# Patient Record
Sex: MALE | Race: WHITE
[De-identification: names, ages, dates, MRNs, and addresses within clinical notes are randomized per-mention and may not be internally consistent; named-entity substitution may affect disease eponyms.]

---

## 2018-07-19 ENCOUNTER — HOSPITAL ENCOUNTER (OUTPATIENT)
Dept: HOSPITAL 47 - LABPAT | Age: 78
Discharge: HOME | End: 2018-07-19
Attending: UROLOGY
Payer: MEDICARE

## 2018-07-19 DIAGNOSIS — Z79.899: ICD-10-CM

## 2018-07-19 DIAGNOSIS — R35.0: ICD-10-CM

## 2018-07-19 DIAGNOSIS — R97.20: ICD-10-CM

## 2018-07-19 DIAGNOSIS — Z01.812: ICD-10-CM

## 2018-07-19 DIAGNOSIS — R53.83: ICD-10-CM

## 2018-07-19 DIAGNOSIS — R31.29: ICD-10-CM

## 2018-07-19 DIAGNOSIS — N40.1: ICD-10-CM

## 2018-07-19 DIAGNOSIS — Z01.818: Primary | ICD-10-CM

## 2018-07-19 LAB
ANION GAP SERPL CALC-SCNC: 5 MMOL/L
BASOPHILS # BLD AUTO: 0 K/UL (ref 0–0.2)
BASOPHILS NFR BLD AUTO: 1 %
BUN SERPL-SCNC: 13 MG/DL (ref 9–20)
CALCIUM SPEC-MCNC: 8.9 MG/DL (ref 8.4–10.2)
CHLORIDE SERPL-SCNC: 101 MMOL/L (ref 98–107)
CO2 SERPL-SCNC: 30 MMOL/L (ref 22–30)
EOSINOPHIL # BLD AUTO: 0.1 K/UL (ref 0–0.7)
EOSINOPHIL NFR BLD AUTO: 2 %
ERYTHROCYTE [DISTWIDTH] IN BLOOD BY AUTOMATED COUNT: 4.58 M/UL (ref 4.3–5.9)
ERYTHROCYTE [DISTWIDTH] IN BLOOD: 13.8 % (ref 11.5–15.5)
GLUCOSE SERPL-MCNC: 102 MG/DL (ref 74–99)
HCT VFR BLD AUTO: 41.1 % (ref 39–53)
HGB BLD-MCNC: 13.6 GM/DL (ref 13–17.5)
LYMPHOCYTES # SPEC AUTO: 1.2 K/UL (ref 1–4.8)
LYMPHOCYTES NFR SPEC AUTO: 29 %
MCH RBC QN AUTO: 29.6 PG (ref 25–35)
MCHC RBC AUTO-ENTMCNC: 33 G/DL (ref 31–37)
MCV RBC AUTO: 89.6 FL (ref 80–100)
MONOCYTES # BLD AUTO: 0.4 K/UL (ref 0–1)
MONOCYTES NFR BLD AUTO: 9 %
NEUTROPHILS # BLD AUTO: 2.4 K/UL (ref 1.3–7.7)
NEUTROPHILS NFR BLD AUTO: 57 %
PLATELET # BLD AUTO: 142 K/UL (ref 150–450)
POTASSIUM SERPL-SCNC: 4.6 MMOL/L (ref 3.5–5.1)
PSA SERPL-MCNC: 5.67 NG/ML (ref 0–4)
SODIUM SERPL-SCNC: 136 MMOL/L (ref 137–145)
WBC # BLD AUTO: 4.2 K/UL (ref 3.8–10.6)

## 2018-07-19 PROCEDURE — 93005 ELECTROCARDIOGRAM TRACING: CPT

## 2018-07-19 PROCEDURE — 87086 URINE CULTURE/COLONY COUNT: CPT

## 2018-07-19 PROCEDURE — 80048 BASIC METABOLIC PNL TOTAL CA: CPT

## 2018-07-19 PROCEDURE — 36415 COLL VENOUS BLD VENIPUNCTURE: CPT

## 2018-07-19 PROCEDURE — 85025 COMPLETE CBC W/AUTO DIFF WBC: CPT

## 2018-07-19 PROCEDURE — 84153 ASSAY OF PSA TOTAL: CPT

## 2018-07-30 NOTE — P.GSHP
History of Present Illness


H&P Date: 07/30/18


Chief Complaint: BPH with obstruction


The patient is a 77-year-old white male wwith known BPH.  He has been treated 

with combination medical therapy (tamsulosin and finasteride), but continues to 

experience difficulty emptying his bladder.  Cystoscopy reveals a proximal 

bulbous urethral stricture, as well as a completely occluding prostate.  He 

thus comes for cystoscopy, internal ureterotomy, and transurethral resection of 

prostate (TURP).





The patient's PSA level has been persistently elevated.  Prostate ultrasound in 

2012 revealed a prostate volume of 72 cc.  Biopsies were negative.





- Gastrointestinal


Gastrointestinal: Reports abdominal pain





- Integumentary


Integumentary: Reports rash





Past Medical History


Past Medical History: Hyperlipidemia, Prostate Disorder


Additional Past Medical History / Comment(s): BPH


History of Any Multi-Drug Resistant Organisms: None Reported


Past Surgical History: Hernia Repair


Additional Past Surgical History / Comment(s): Nasal surgery, Colonoscopy


Past Anesthesia/Blood Transfusion Reactions: No Reported Reaction


Smoking Status: Never smoker





- Past Family History


  ** Sister(s)


Family Medical History: Cancer





Medications and Allergies


 Home Medications











 Medication  Instructions  Recorded  Confirmed  Type


 


Tamsulosin HCl [Flomax] 0.4 mg PO DAILY 04/18/16 07/24/18 History


 


Aspirin [Adult Low Dose Aspirin EC] 81 mg PO DAILY 12/04/17 07/24/18 History











 Allergies











Allergy/AdvReac Type Severity Reaction Status Date / Time


 


No Known Allergies Allergy   Verified 07/24/18 12:32














Surgical - Exam





- General


well developed, well nourished, no distress





- Respiratory


normal respiratory effort, clear to auscultation





- Cardiovascular


Rhythm: regular


Abnormal Heart Sounds: no systolic murmur, no diastolic murmur, no rub, no S3 

Gallop, no S4 Gallop, no click, no other





- Abdomen


Abdomen: soft, non tender, no guarding, no rigid, no rebound





- Genitourinary


normal penis with no external lesions, testicles non-tender





Assessment and Plan


(1) Enlarged prostate with lower urinary tract symptoms (LUTS)


Status: Acute   Code(s): N40.1 - BENIGN PROSTATIC HYPERPLASIA WITH LOWER 

URINARY TRACT SYMP   SNOMED Code(s): 015826804


   





(2) Urethral stricture


Status: Acute   Code(s): N35.9 - URETHRAL STRICTURE, UNSPECIFIED   SNOMED Code(s

): 40750588


   


Plan: 


Cystoscopy, internal ureterotomy, bipolar transurethral resection of prostate (

TURP).  The procedure has been reviewed in detail with the patient.  Potential 

risks include anesthesia, bleeding,  infection, urinary incontinence, 

persistent voiding symptoms, bladder neck contracture, recurrent stricture, 

erectile dysfunction, and retrograde ejaculation.  He also understands the 

possibility that occult prostate cancer will be identified.

## 2018-08-02 ENCOUNTER — HOSPITAL ENCOUNTER (OUTPATIENT)
Dept: HOSPITAL 47 - OR | Age: 78
LOS: 2 days | Discharge: HOME | End: 2018-08-04
Attending: UROLOGY
Payer: MEDICARE

## 2018-08-02 VITALS — BODY MASS INDEX: 22.4 KG/M2

## 2018-08-02 DIAGNOSIS — N40.1: Primary | ICD-10-CM

## 2018-08-02 DIAGNOSIS — Q62.10: ICD-10-CM

## 2018-08-02 DIAGNOSIS — R39.14: ICD-10-CM

## 2018-08-02 PROCEDURE — 52601 PROSTATECTOMY (TURP): CPT

## 2018-08-02 PROCEDURE — 88305 TISSUE EXAM BY PATHOLOGIST: CPT

## 2018-08-02 RX ADMIN — CEFAZOLIN SCH: 330 INJECTION, POWDER, FOR SOLUTION INTRAMUSCULAR; INTRAVENOUS at 14:58

## 2018-08-02 RX ADMIN — POTASSIUM CHLORIDE SCH: 14.9 INJECTION, SOLUTION INTRAVENOUS at 13:43

## 2018-08-02 RX ADMIN — POTASSIUM CHLORIDE SCH MLS/HR: 14.9 INJECTION, SOLUTION INTRAVENOUS at 14:04

## 2018-08-02 RX ADMIN — POTASSIUM CHLORIDE SCH MLS: 14.9 INJECTION, SOLUTION INTRAVENOUS at 08:59

## 2018-08-02 RX ADMIN — DOCUSATE SODIUM SCH MG: 100 CAPSULE, LIQUID FILLED ORAL at 20:41

## 2018-08-03 VITALS — RESPIRATION RATE: 16 BRPM

## 2018-08-03 RX ADMIN — DOCUSATE SODIUM SCH MG: 100 CAPSULE, LIQUID FILLED ORAL at 08:03

## 2018-08-03 RX ADMIN — CEFAZOLIN SCH: 330 INJECTION, POWDER, FOR SOLUTION INTRAMUSCULAR; INTRAVENOUS at 16:41

## 2018-08-03 RX ADMIN — POTASSIUM CHLORIDE SCH MLS/HR: 14.9 INJECTION, SOLUTION INTRAVENOUS at 04:11

## 2018-08-03 RX ADMIN — POTASSIUM CHLORIDE SCH: 14.9 INJECTION, SOLUTION INTRAVENOUS at 17:27

## 2018-08-03 RX ADMIN — POTASSIUM CHLORIDE SCH: 14.9 INJECTION, SOLUTION INTRAVENOUS at 07:31

## 2018-08-03 RX ADMIN — DOCUSATE SODIUM SCH MG: 100 CAPSULE, LIQUID FILLED ORAL at 20:29

## 2018-08-03 NOTE — P.PN
Progress Note - Text


Progress Note Date: 08/03/18


The patient was seen earlier this morning.  At that time, the urine was 

essentially clear with continuous bladder irrigation running.  He was 

comfortable.  He was afebrile with stable vital signs.  Continuous bladder 

irrigation was held, and throughout the day hematuria was noted without clots.  

The decision was made for him to remain hospitalized an additional day.

## 2018-08-04 VITALS — DIASTOLIC BLOOD PRESSURE: 67 MMHG | HEART RATE: 85 BPM | TEMPERATURE: 97.6 F | SYSTOLIC BLOOD PRESSURE: 141 MMHG

## 2018-08-04 RX ADMIN — CEFAZOLIN SCH: 330 INJECTION, POWDER, FOR SOLUTION INTRAMUSCULAR; INTRAVENOUS at 17:24

## 2018-08-04 RX ADMIN — DOCUSATE SODIUM SCH MG: 100 CAPSULE, LIQUID FILLED ORAL at 11:08

## 2018-08-04 NOTE — P.DS
Providers


Expected date of discharge: 08/04/18


Attending physician: 


Mirza Reynaga





Primary care physician: 


Bud Mcclureer








- Discharge Diagnosis(es)


(1) Enlarged prostate with lower urinary tract symptoms (LUTS)


Current Visit: No   Status: Acute   





(2) Urethral stricture


Current Visit: No   Status: Acute   


Hospital Course: 





On the day of admission, the patient underwent a DVIU and bipolar TURP.  

Postoperatively, there was somewhat more bleeding than is typical.  He was 

otherwise afebrile with stable vital signs, and quite comfortable.  Continuous 

bladder irrigation was discontinued on the first postoperative day, but the 

urine was pinkred in color.  On the second postoperative day, the urine was 

clear yellow in color, and he was comfortable.  He will thus be discharged home 

with the Ashton catheter.


Procedures: 


Cystoscopy, DVIU, bipolar TURP on 08/02/2018.





Patient Condition at Discharge: Good





Plan - Discharge Summary


Discharge Rx Participant: Yes


New Discharge Prescriptions: 


No Action


   Tamsulosin HCl [Flomax] 0.4 mg PO DAILY


   Aspirin [Adult Low Dose Aspirin EC] 81 mg PO DAILY


Discharge Medication List





Tamsulosin HCl [Flomax] 0.4 mg PO DAILY 04/18/16 [History]


Aspirin [Adult Low Dose Aspirin EC] 81 mg PO DAILY 12/04/17 [History]








Follow up Appointment(s)/Referral(s): 


Mirza Reynaga MD [STAFF PHYSICIAN] - 10 Days


Activity/Diet/Wound Care/Special Instructions: 


Discharge home with Ashton catheter.  Plug irrigation port of the catheter.  No 

lifting or strenuous activity.  Drink plenty of fluids.  Patient has been 

instructed to remove Ashton catheter early in the morning on 08/07/2018.


Discharge Disposition: HOME SELF-CARE

## 2018-08-10 ENCOUNTER — HOSPITAL ENCOUNTER (OUTPATIENT)
Dept: HOSPITAL 47 - EC | Age: 78
Setting detail: OBSERVATION
LOS: 1 days | Discharge: HOME | End: 2018-08-11
Attending: UROLOGY | Admitting: UROLOGY
Payer: MEDICARE

## 2018-08-10 DIAGNOSIS — R31.0: Primary | ICD-10-CM

## 2018-08-10 DIAGNOSIS — Z79.82: ICD-10-CM

## 2018-08-10 DIAGNOSIS — N32.89: ICD-10-CM

## 2018-08-10 DIAGNOSIS — Z79.899: ICD-10-CM

## 2018-08-10 DIAGNOSIS — Z98.890: ICD-10-CM

## 2018-08-10 DIAGNOSIS — N40.0: ICD-10-CM

## 2018-08-10 DIAGNOSIS — R33.9: ICD-10-CM

## 2018-08-10 DIAGNOSIS — Z80.9: ICD-10-CM

## 2018-08-10 LAB
ALBUMIN SERPL-MCNC: 3.2 G/DL (ref 3.5–5)
ALP SERPL-CCNC: 67 U/L (ref 38–126)
ALT SERPL-CCNC: 39 U/L (ref 21–72)
ANION GAP SERPL CALC-SCNC: 6 MMOL/L
APTT BLD: 23.3 SEC (ref 22–30)
AST SERPL-CCNC: 30 U/L (ref 17–59)
BASOPHILS # BLD AUTO: 0 K/UL (ref 0–0.2)
BASOPHILS NFR BLD AUTO: 0 %
BUN SERPL-SCNC: 14 MG/DL (ref 9–20)
CALCIUM SPEC-MCNC: 8.4 MG/DL (ref 8.4–10.2)
CHLORIDE SERPL-SCNC: 101 MMOL/L (ref 98–107)
CO2 SERPL-SCNC: 25 MMOL/L (ref 22–30)
EOSINOPHIL # BLD AUTO: 0.1 K/UL (ref 0–0.7)
EOSINOPHIL NFR BLD AUTO: 1 %
ERYTHROCYTE [DISTWIDTH] IN BLOOD BY AUTOMATED COUNT: 3.71 M/UL (ref 4.3–5.9)
ERYTHROCYTE [DISTWIDTH] IN BLOOD: 13.6 % (ref 11.5–15.5)
GLUCOSE SERPL-MCNC: 115 MG/DL (ref 74–99)
HCT VFR BLD AUTO: 32.3 % (ref 39–53)
HGB BLD-MCNC: 10.8 GM/DL (ref 13–17.5)
INR PPP: 1.1 (ref ?–1.2)
LYMPHOCYTES # SPEC AUTO: 0.9 K/UL (ref 1–4.8)
LYMPHOCYTES NFR SPEC AUTO: 10 %
MCH RBC QN AUTO: 29.2 PG (ref 25–35)
MCHC RBC AUTO-ENTMCNC: 33.6 G/DL (ref 31–37)
MCV RBC AUTO: 86.9 FL (ref 80–100)
MONOCYTES # BLD AUTO: 0.5 K/UL (ref 0–1)
MONOCYTES NFR BLD AUTO: 5 %
NEUTROPHILS # BLD AUTO: 7.3 K/UL (ref 1.3–7.7)
NEUTROPHILS NFR BLD AUTO: 83 %
PLATELET # BLD AUTO: 163 K/UL (ref 150–450)
POTASSIUM SERPL-SCNC: 4.4 MMOL/L (ref 3.5–5.1)
PROT SERPL-MCNC: 5.2 G/DL (ref 6.3–8.2)
PT BLD: 10.5 SEC (ref 9–12)
SODIUM SERPL-SCNC: 132 MMOL/L (ref 137–145)
WBC # BLD AUTO: 8.8 K/UL (ref 3.8–10.6)

## 2018-08-10 PROCEDURE — 99284 EMERGENCY DEPT VISIT MOD MDM: CPT

## 2018-08-10 PROCEDURE — 52001 CYSTO W/IRRG&EVAC MLT CLOTS: CPT

## 2018-08-10 PROCEDURE — 85025 COMPLETE CBC W/AUTO DIFF WBC: CPT

## 2018-08-10 PROCEDURE — 85730 THROMBOPLASTIN TIME PARTIAL: CPT

## 2018-08-10 PROCEDURE — 36415 COLL VENOUS BLD VENIPUNCTURE: CPT

## 2018-08-10 PROCEDURE — 85610 PROTHROMBIN TIME: CPT

## 2018-08-10 PROCEDURE — 80053 COMPREHEN METABOLIC PANEL: CPT

## 2018-08-10 RX ADMIN — DEXTROSE MONOHYDRATE, SODIUM CHLORIDE, AND POTASSIUM CHLORIDE SCH MLS/HR: 50; 4.5; 1.49 INJECTION, SOLUTION INTRAVENOUS at 21:09

## 2018-08-10 NOTE — ED
Male Urogenital HPI





- General


Source: patient, EMS, RN notes reviewed


Mode of arrival: EMS


Limitations: no limitations





<Rishi Barriga - Last Filed: 08/10/18 18:03>





<Bud Bo - Last Filed: 08/11/18 00:27>





- General


Chief complaint: Urogenital


Stated complaint: Urinary Retention


Time Seen by Provider: 08/10/18 12:23





- History of Present Illness


Initial comments: 





77-year-old male presents emergency Department as a transfer from [facility] for urinary retention, hematuria.  Patient had a TURP procedure by Dr. Reynaga on 08/02/2018.  Patient states he self removed his catheter on August 8.

  Patient states he is able to go he did state that there was some burning, 

mild blood states that since last night into today he was unable to really get 

much urine out.  Patient went to the hospital was found to have urinary 

retention secondary to a blood clot.  He did have a Ashton placed there were 

unable to irrigate it thoroughly replaced it with a three-way Ashton.  Patient 

states that they're able to irrigate some urine output is very bloody and he 

was transferred here for further care.  Patient states he feels that it's 

building up again and states that he feels some pressure in his lower abdomen.  

Patient denies any vomiting, diarrhea, constipation, fever, chills. (Rishi Barriga)





- Related Data


 Home Medications











 Medication  Instructions  Recorded  Confirmed


 


Tamsulosin HCl [Flomax] 0.4 mg PO DAILY 04/18/16 08/10/18


 


Aspirin [Adult Low Dose Aspirin EC] 81 mg PO DAILY 12/04/17 08/10/18











 Allergies











Allergy/AdvReac Type Severity Reaction Status Date / Time


 


No Known Allergies Allergy   Verified 08/10/18 17:34














Review of Systems


ROS Other: All systems not noted in ROS Statement are negative.





<Rishi Barriga - Last Filed: 08/10/18 18:03>


ROS Other: All systems not noted in ROS Statement are negative.





<Bud Bo - Last Filed: 08/11/18 00:27>


ROS Statement: 


Those systems with pertinent positive or pertinent negative responses have been 

documented in the HPI.








Past Medical History


Past Medical History: Prostate Disorder


Additional Past Medical History / Comment(s): BPH


History of Any Multi-Drug Resistant Organisms: None Reported


Past Surgical History: Hernia Repair


Additional Past Surgical History / Comment(s): nasal surgery and colonoscopy, 

TURP


Past Anesthesia/Blood Transfusion Reactions: No Reported Reaction


Past Psychological History: No Psychological Hx Reported


Smoking Status: Never smoker


Past Alcohol Use History: Occasional


Past Drug Use History: None Reported





- Past Family History


  ** Sister(s)


Family Medical History: Cancer





<Rishi Barriga - Last Filed: 08/10/18 18:03>





General Exam


Limitations: no limitations


General appearance: alert, in no apparent distress


Head exam: Present: atraumatic, normocephalic, normal inspection


Neck exam: Present: normal inspection.  Absent: tenderness, meningismus, 

lymphadenopathy


Respiratory exam: Present: normal lung sounds bilaterally.  Absent: respiratory 

distress, wheezes, rales, rhonchi, stridor


Cardiovascular Exam: Present: regular rate, normal rhythm, normal heart sounds.

  Absent: systolic murmur, diastolic murmur, rubs, gallop, clicks


GI/Abdominal exam: Present: soft, tenderness (Mild suprapubic), normal bowel 

sounds.  Absent: distended, guarding, rebound, rigid


Back exam: Absent: CVA tenderness (R), CVA tenderness (L)


Skin exam: Present: warm, dry, intact, normal color.  Absent: rash





<Rishi Barriga - Last Filed: 08/10/18 18:03>





 Vital Signs











  08/10/18 08/10/18





  12:28 17:25


 


Temperature 97.3 F L 98.3 F


 


Pulse Rate 79 80


 


Respiratory 18 18





Rate  


 


Blood Pressure 199/95 139/69


 


O2 Sat by Pulse 99 99





Oximetry  














Medical Decision Making





- Lab Data


Result diagrams: 


 08/10/18 12:33





 08/10/18 12:33





<Rishi Barriga - Last Filed: 08/10/18 18:03>





- Lab Data


Result diagrams: 


 08/10/18 12:33





 08/10/18 12:33





<Bud Bo - Last Filed: 08/11/18 00:27>





- Medical Decision Making





77-year-old male presented from [place] for urinary retention, hematuria.  

Patient had gross hematuria while in the emergency department.  He's had 

constant irrigation, flushing for clot removal.  He continues to have bleeding 

at this time.  Patient was evaluated by urology Dr. Handley in the emergency 

department.  Patient will be taken to the lower at this time. (Rishi Barriga)





77-year-old male with urinary retention and hematuria.  Patient was evaluated 

by previous physician, Dr. Javier.  Patient was awaiting bladder irrigation in 

the emergency department with plans for a large.  Patient evaluated by urology 

and taken to the operating room.  I personally did not evaluate this patient. (

Bud Bo)





- Lab Data





 Lab Results











  08/10/18 08/10/18 08/10/18 Range/Units





  12:33 12:33 12:33 


 


WBC  8.8    (3.8-10.6)  k/uL


 


RBC  3.71 L    (4.30-5.90)  m/uL


 


Hgb  10.8 L    (13.0-17.5)  gm/dL


 


Hct  32.3 L    (39.0-53.0)  %


 


MCV  86.9    (80.0-100.0)  fL


 


MCH  29.2    (25.0-35.0)  pg


 


MCHC  33.6    (31.0-37.0)  g/dL


 


RDW  13.6    (11.5-15.5)  %


 


Plt Count  163    (150-450)  k/uL


 


Neutrophils %  83    %


 


Lymphocytes %  10    %


 


Monocytes %  5    %


 


Eosinophils %  1    %


 


Basophils %  0    %


 


Neutrophils #  7.3    (1.3-7.7)  k/uL


 


Lymphocytes #  0.9 L    (1.0-4.8)  k/uL


 


Monocytes #  0.5    (0-1.0)  k/uL


 


Eosinophils #  0.1    (0-0.7)  k/uL


 


Basophils #  0.0    (0-0.2)  k/uL


 


PT    10.5  (9.0-12.0)  sec


 


INR    1.1  (<1.2)  


 


APTT    23.3  (22.0-30.0)  sec


 


Sodium   132 L   (137-145)  mmol/L


 


Potassium   4.4   (3.5-5.1)  mmol/L


 


Chloride   101   ()  mmol/L


 


Carbon Dioxide   25   (22-30)  mmol/L


 


Anion Gap   6   mmol/L


 


BUN   14   (9-20)  mg/dL


 


Creatinine   0.69   (0.66-1.25)  mg/dL


 


Est GFR (CKD-EPI)AfAm   >90   (>60 ml/min/1.73 sqM)  


 


Est GFR (CKD-EPI)NonAf   >90   (>60 ml/min/1.73 sqM)  


 


Glucose   115 H   (74-99)  mg/dL


 


Calcium   8.4   (8.4-10.2)  mg/dL


 


Total Bilirubin   0.5   (0.2-1.3)  mg/dL


 


AST   30   (17-59)  U/L


 


ALT   39   (21-72)  U/L


 


Alkaline Phosphatase   67   ()  U/L


 


Total Protein   5.2 L   (6.3-8.2)  g/dL


 


Albumin   3.2 L   (3.5-5.0)  g/dL














Disposition





<Rishi Barriga - Last Filed: 08/10/18 18:03>





<Bud Bo - Last Filed: 08/11/18 00:27>


Clinical Impression: 


 Hematuria, Urinary retention, Status post recent transurethral resection of 

prostate





Disposition: ADMITTED AS IP TO THIS HOSP


Condition: Fair

## 2018-08-10 NOTE — P.OP
Date of Procedure: 08/10/18


Preoperative Diagnosis: 


Post TURP gross hematuria with clot retention


Postoperative Diagnosis: 


Post TURP gross hematuria with clot urinary retention


Procedure(s) Performed: 


Cystoscopy with evacuation of blood clots and cautery of prostatic fossa


Anesthesia: DARIEN


Surgeon: Jose Daniel Handley


Estimated Blood Loss (ml): 15


Pathology: none sent


Condition: stable


Disposition: PACU


Indications for Procedure: 


The patient is a 77-year-old male who underwent TURP on 8/2.  His catheter was 

removed on 8/7 and he has had persistent gross hematuria since then.  About 

urinary retention secondary to clots which had been consistently plugging his 

catheter since this morning.  Cystoscopy under anesthesia is planned.


Description of Procedure: 


The patient was taken the operating suite where adequate general anesthesia via 

mask was instituted.  The patient was placed in the dorsal lithotomy position 

with his legs suspended from padded Corona stirrups.  Patient's urethral 

catheter was removed.  Penile and prostatic urethra traversed under direct 

vision using the 19-Niuean cystoscope sheath.  The prostatic fossa and bladder 

appeared plugged with clots.  The cystoscope was removed.  The 25-Niuean 

resectoscope sheath with visual obturator and 30 lens was passed through the 

penile and prostatic urethra under direct vision.  Using the resectoscope loop 

the clot was teased away from the prostatic fossa.  The clot within the bladder 

and prostatic fossa was then irrigated out using the helical evacuator.  An 

estimated 500 mL of old clot was removed.  The prostatic fossa and bladder neck 

was then thoroughly examined.  There appeared to be some oozing near the apex 

of the prostate between 5 and 7:00 and between 11 and 1:00.  These areas were 

cauterized.  There was also some oozing at the bladder neck at 7:00 which was 

cauterized.  At the completion the procedure hemostasis appeared good.  

Resectoscope was withdrawn.  A 22-Niuean three-way catheter with 30 mL balloon 

was inserted using a wire catheter guide.  This was connected to continuous 

bladder irrigation and gravity drainage.





The patient tolerated procedure well and will be admitted for observation.  

Blood loss during the procedure was less than 15 mL.

## 2018-08-10 NOTE — P.GSHP
History of Present Illness


H&P Date: 08/10/18


Chief Complaint: Gross hematuria


This is an interval history and physical.  The patient originally underwent 

TURP and DVIU performed by Dr. Reynaga on 8/2/2018 for treatment of bladder 

outflow obstruction secondary to BPH and a urethral stricture.  The patient was 

discharged on 8/4 with his catheter in place.  He says his urine was clear by 8/ 6 and he removed his catheter as directed on 8/7.  He says that he began 

experiencing bloody urine immediately after the catheter is removed.  The 

patient was able to void up until this morning when he began experiencing blood 

clots which prevented him from emptying his bladder.  He went to the [facility] emergency room early this morning where a catheter was inserted and 

multiple clots were removed.  A three-way catheter was inserted as the patient 

continued to have gross hematuria.  He was transferred to [facility] emergency room where he continued to have gross hematuria.  I saw the 

patient at approximately 3:30 is afternoon and irrigated several clots from his 

bladder.  At that time his bladder hadn't been distended by irrigating fluid to 

over 500 mL.  Since then he has continued to have bright red blood draining 

from the bladder despite continuous irrigation.  The findings are consistent 

with an arterial bleed.  Cystoscopy under anesthesia is planned to control the 

bleeding.





The remainder the patient's history and physical are essentially unchanged from 

that noted on the history and physical performed by Dr. Reynaga prior to the 

surgery done on 8/2/2018.








- Review of Systems


All systems: negative (as noted on the history and physical.)





Past Medical History


Past Medical History: Prostate Disorder


Additional Past Medical History / Comment(s): BPH


History of Any Multi-Drug Resistant Organisms: None Reported


Past Surgical History: Hernia Repair, Prostate Surgery (Bipolar TURP  and 

direct vision internal urethrotomy 8/2/2018)


Additional Past Surgical History / Comment(s): nasal surgery and colonoscopy, 

TURP


Past Anesthesia/Blood Transfusion Reactions: No Reported Reaction


Past Psychological History: No Psychological Hx Reported


Smoking Status: Never smoker


Past Alcohol Use History: Occasional


Past Drug Use History: None Reported





- Past Family History


  ** Sister(s)


Family Medical History: Cancer





Medications and Allergies


 Home Medications











 Medication  Instructions  Recorded  Confirmed  Type


 


Tamsulosin HCl [Flomax] 0.4 mg PO DAILY 04/18/16 08/10/18 History


 


Aspirin [Adult Low Dose Aspirin EC] 81 mg PO DAILY 12/04/17 08/10/18 History











 Allergies











Allergy/AdvReac Type Severity Reaction Status Date / Time


 


No Known Allergies Allergy   Verified 08/10/18 17:34














Surgical - Exam


 Vital Signs











Temp Pulse Resp BP Pulse Ox


 


 97.3 F L  79   18   199/95   99 


 


 08/10/18 12:28  08/10/18 12:28  08/10/18 12:28  08/10/18 12:28  08/10/18 12:28














- General


well developed, moderate distress





- Respiratory


normal respiratory effort





- Abdomen


Abdomen: tender (suprapubic area prior to drainage of bladder)





- Genitourinary


normal penis with no external lesions, other (22-Barbadian three-way catheter is 

in place and is draining red colored urine with occasional clots despite 

continuous bladder irrigation)





Results





- Labs





 08/10/18 12:33





 08/10/18 12:33


 Abnormal Lab Results - Last 24 Hours (Table)











  08/10/18 08/10/18 Range/Units





  12:33 12:33 


 


RBC  3.71 L   (4.30-5.90)  m/uL


 


Hgb  10.8 L   (13.0-17.5)  gm/dL


 


Hct  32.3 L   (39.0-53.0)  %


 


Lymphocytes #  0.9 L   (1.0-4.8)  k/uL


 


Sodium   132 L  (137-145)  mmol/L


 


Glucose   115 H  (74-99)  mg/dL


 


Total Protein   5.2 L  (6.3-8.2)  g/dL


 


Albumin   3.2 L  (3.5-5.0)  g/dL








 Diabetes panel











  08/10/18 Range/Units





  12:33 


 


Sodium  132 L  (137-145)  mmol/L


 


Potassium  4.4  (3.5-5.1)  mmol/L


 


Chloride  101  ()  mmol/L


 


Carbon Dioxide  25  (22-30)  mmol/L


 


BUN  14  (9-20)  mg/dL


 


Creatinine  0.69  (0.66-1.25)  mg/dL


 


Glucose  115 H  (74-99)  mg/dL


 


Calcium  8.4  (8.4-10.2)  mg/dL


 


AST  30  (17-59)  U/L


 


ALT  39  (21-72)  U/L


 


Alkaline Phosphatase  67  ()  U/L


 


Total Protein  5.2 L  (6.3-8.2)  g/dL


 


Albumin  3.2 L  (3.5-5.0)  g/dL








 Calcium panel











  08/10/18 Range/Units





  12:33 


 


Calcium  8.4  (8.4-10.2)  mg/dL


 


Albumin  3.2 L  (3.5-5.0)  g/dL








 Pituitary panel











  08/10/18 Range/Units





  12:33 


 


Sodium  132 L  (137-145)  mmol/L


 


Potassium  4.4  (3.5-5.1)  mmol/L


 


Chloride  101  ()  mmol/L


 


Carbon Dioxide  25  (22-30)  mmol/L


 


BUN  14  (9-20)  mg/dL


 


Creatinine  0.69  (0.66-1.25)  mg/dL


 


Glucose  115 H  (74-99)  mg/dL


 


Calcium  8.4  (8.4-10.2)  mg/dL








 Adrenal panel











  08/10/18 Range/Units





  12:33 


 


Sodium  132 L  (137-145)  mmol/L


 


Potassium  4.4  (3.5-5.1)  mmol/L


 


Chloride  101  ()  mmol/L


 


Carbon Dioxide  25  (22-30)  mmol/L


 


BUN  14  (9-20)  mg/dL


 


Creatinine  0.69  (0.66-1.25)  mg/dL


 


Glucose  115 H  (74-99)  mg/dL


 


Calcium  8.4  (8.4-10.2)  mg/dL


 


Total Bilirubin  0.5  (0.2-1.3)  mg/dL


 


AST  30  (17-59)  U/L


 


ALT  39  (21-72)  U/L


 


Alkaline Phosphatase  67  ()  U/L


 


Total Protein  5.2 L  (6.3-8.2)  g/dL


 


Albumin  3.2 L  (3.5-5.0)  g/dL














Assessment and Plan


(1) Hematuria


Narrative/Plan: 


The patient's gross hematuria is most likely related to active bleeding from 

the prostatic fossa.  The patient had been taking a baby aspirin daily 

beginning several days ago which may also have contributed to the bleeding.





In view of the active bleeding and continued problems with catheter plugging 

from clots I believe that cystoscopy under anesthesia with cautery of active 

bleeding should be performed as soon as possible and this will be done tonight.


Current Visit: Yes   Status: Acute   Code(s): R31.9 - HEMATURIA, UNSPECIFIED   

SNOMED Code(s): 90612248

## 2018-08-11 VITALS — DIASTOLIC BLOOD PRESSURE: 51 MMHG | HEART RATE: 76 BPM | SYSTOLIC BLOOD PRESSURE: 116 MMHG | TEMPERATURE: 98.1 F

## 2018-08-11 VITALS — RESPIRATION RATE: 14 BRPM

## 2018-08-11 RX ADMIN — DEXTROSE MONOHYDRATE, SODIUM CHLORIDE, AND POTASSIUM CHLORIDE SCH MLS/HR: 50; 4.5; 1.49 INJECTION, SOLUTION INTRAVENOUS at 10:14

## 2018-08-11 NOTE — P.DS
Providers


Date of admission: 


08/10/18 18:32





Expected date of discharge: 08/11/18


Attending physician: 


Jose Daniel Handley





Primary care physician: 


Bud Sahu








- Discharge Diagnosis(es)


(1) Hematuria


The patient had undergone TURP on 8/2 and removed his catheter on 8/7.  He 

developed gross hematuria which resulted in clot urinary retention on 8/10.  He 

was transferred from the [facility] emergency room and it was impossible to 

remove all the clots from the patient's bladder in the emergency room when he 

arrived here.  He underwent cystoscopy under anesthesia where approximately 12-

16 ounces of old clot were removed from the bladder and prostatic fossa.  

Several oozing areas within the prostatic fossa were cauterized.  A three-way 

catheter was inserted following the surgery and the patient was treated with 

continuous bladder irrigation overnight.  His urine was clear the following 

morning.  He will be discharged with the catheter in place with the intent that 

if the urine remains clear the catheter will be removed on 8/13.


Current Visit: Yes   Status: Acute   


Patient Condition at Discharge: Fair





Plan - Discharge Summary


Discharge Rx Participant: No


New Discharge Prescriptions: 


Discontinued


   Aspirin [Adult Low Dose Aspirin EC] 81 mg PO DAILY





No Action


   Tamsulosin HCl [Flomax] 0.4 mg PO DAILY


Discharge Medication List





Tamsulosin HCl [Flomax] 0.4 mg PO DAILY 04/18/16 [History]








Follow up Appointment(s)/Referral(s): 


Bud Sahu MD [Primary Care Provider] - As Needed


Mirza Reynaga MD [STAFF PHYSICIAN] - 08/13/18 2:00 pm


Activity/Diet/Wound Care/Special Instructions: 


Catheter to drainage.  The patient is to call Dr. Reynaga Monday morning at 8 AM 

to see if he can remove his catheter at that time.


Discharge Disposition: HOME SELF-CARE

## 2021-01-08 ENCOUNTER — HOSPITAL ENCOUNTER (EMERGENCY)
Dept: HOSPITAL 47 - EC | Age: 81
Discharge: HOME | End: 2021-01-08
Payer: COMMERCIAL

## 2021-01-08 VITALS — HEART RATE: 81 BPM | TEMPERATURE: 98.3 F | SYSTOLIC BLOOD PRESSURE: 144 MMHG | DIASTOLIC BLOOD PRESSURE: 76 MMHG

## 2021-01-08 VITALS — RESPIRATION RATE: 18 BRPM

## 2021-01-08 DIAGNOSIS — N39.0: ICD-10-CM

## 2021-01-08 DIAGNOSIS — N28.1: Primary | ICD-10-CM

## 2021-01-08 LAB
ALBUMIN SERPL-MCNC: 4.1 G/DL (ref 3.5–5)
ALP SERPL-CCNC: 80 U/L (ref 38–126)
ALT SERPL-CCNC: 20 U/L (ref 4–49)
ANION GAP SERPL CALC-SCNC: 6 MMOL/L
APTT BLD: 23.5 SEC (ref 22–30)
AST SERPL-CCNC: 32 U/L (ref 17–59)
BASOPHILS # BLD AUTO: 0.1 K/UL (ref 0–0.2)
BASOPHILS NFR BLD AUTO: 1 %
BUN SERPL-SCNC: 18 MG/DL (ref 9–20)
CALCIUM SPEC-MCNC: 9.4 MG/DL (ref 8.4–10.2)
CHLORIDE SERPL-SCNC: 100 MMOL/L (ref 98–107)
CO2 SERPL-SCNC: 28 MMOL/L (ref 22–30)
EOSINOPHIL # BLD AUTO: 0.1 K/UL (ref 0–0.7)
EOSINOPHIL NFR BLD AUTO: 1 %
ERYTHROCYTE [DISTWIDTH] IN BLOOD BY AUTOMATED COUNT: 4.66 M/UL (ref 4.3–5.9)
ERYTHROCYTE [DISTWIDTH] IN BLOOD: 13.5 % (ref 11.5–15.5)
GLUCOSE SERPL-MCNC: 118 MG/DL (ref 74–99)
HCT VFR BLD AUTO: 40.2 % (ref 39–53)
HGB BLD-MCNC: 13.7 GM/DL (ref 13–17.5)
INR PPP: 1 (ref ?–1.2)
LYMPHOCYTES # SPEC AUTO: 1.9 K/UL (ref 1–4.8)
LYMPHOCYTES NFR SPEC AUTO: 21 %
MCH RBC QN AUTO: 29.4 PG (ref 25–35)
MCHC RBC AUTO-ENTMCNC: 34 G/DL (ref 31–37)
MCV RBC AUTO: 86.3 FL (ref 80–100)
MONOCYTES # BLD AUTO: 0.7 K/UL (ref 0–1)
MONOCYTES NFR BLD AUTO: 7 %
NEUTROPHILS # BLD AUTO: 6 K/UL (ref 1.3–7.7)
NEUTROPHILS NFR BLD AUTO: 68 %
PLATELET # BLD AUTO: 145 K/UL (ref 150–450)
POTASSIUM SERPL-SCNC: 4.3 MMOL/L (ref 3.5–5.1)
PROT SERPL-MCNC: 6.7 G/DL (ref 6.3–8.2)
PT BLD: 10.8 SEC (ref 9–12)
RBC UR QL: >182 /HPF (ref 0–5)
SODIUM SERPL-SCNC: 134 MMOL/L (ref 137–145)
WBC # BLD AUTO: 8.8 K/UL (ref 3.8–10.6)
WBC # UR AUTO: >182 /HPF (ref 0–5)

## 2021-01-08 PROCEDURE — 87086 URINE CULTURE/COLONY COUNT: CPT

## 2021-01-08 PROCEDURE — 76770 US EXAM ABDO BACK WALL COMP: CPT

## 2021-01-08 PROCEDURE — 85730 THROMBOPLASTIN TIME PARTIAL: CPT

## 2021-01-08 PROCEDURE — 80053 COMPREHEN METABOLIC PANEL: CPT

## 2021-01-08 PROCEDURE — 99284 EMERGENCY DEPT VISIT MOD MDM: CPT

## 2021-01-08 PROCEDURE — 81001 URINALYSIS AUTO W/SCOPE: CPT

## 2021-01-08 PROCEDURE — 36415 COLL VENOUS BLD VENIPUNCTURE: CPT

## 2021-01-08 PROCEDURE — 85610 PROTHROMBIN TIME: CPT

## 2021-01-08 PROCEDURE — 85025 COMPLETE CBC W/AUTO DIFF WBC: CPT

## 2021-01-08 NOTE — ED
General Adult HPI





- General


Chief complaint: Urogenital


Stated complaint: Blood in Urine


Time Seen by Provider: 01/08/21 13:53


Source: patient, RN notes reviewed, old records reviewed


Mode of arrival: ambulatory


Limitations: no limitations





- History of Present Illness


Initial comments: 





80-year-old male presenting with chief complaint of hematuria.  Patient has 

history of BPH, he had a TURP procedure performed in 2018.  He states that 

approximate 45 days ago he developed hematuria with blood clots.  He had urinary

obstruction and retention which was treated with a Ashton catheter placed at an 

outside hospital yesterday.  He's had maria antonia blood in his catheter since that 

time.  He denies significant pain, no flank pain, no lower abdominal pain.  He i

s on aspirin daily.  No other anticoagulation.  No fever.  No vomiting.





- Related Data


                                Home Medications











 Medication  Instructions  Recorded  Confirmed


 


Aspirin EC [Ecotrin Low Dose] 81 mg PO DAILY 01/08/21 01/08/21


 


Calcium(Unknown) 1 tab PO DAILY 01/08/21 01/08/21


 


Vitamin C(Unknown) 1 tab PO DAILY 01/08/21 01/08/21


 


Vitamin D3(Unknown) 1 tab PO DAILY 01/08/21 01/08/21








                                  Previous Rx's











 Medication  Instructions  Recorded


 


Cephalexin [Keflex] 500 mg PO Q12HR #20 cap 01/08/21











                                    Allergies











Allergy/AdvReac Type Severity Reaction Status Date / Time


 


No Known Allergies Allergy   Verified 01/08/21 15:41














Review of Systems


ROS Statement: 


Those systems with pertinent positive or pertinent negative responses have been 

documented in the HPI.





ROS Other: All systems not noted in ROS Statement are negative.





Past Medical History


Past Medical History: Prostate Disorder


Additional Past Medical History / Comment(s): BPH


History of Any Multi-Drug Resistant Organisms: None Reported


Past Surgical History: Hernia Repair, Prostate Surgery


Additional Past Surgical History / Comment(s): nasal surgery and colonoscopy, 

TURP


Past Anesthesia/Blood Transfusion Reactions: No Reported Reaction


Past Psychological History: No Psychological Hx Reported


Smoking Status: Never smoker


Past Alcohol Use History: Occasional


Past Drug Use History: None Reported





- Past Family History


  ** Sister(s)


Family Medical History: Cancer





General Exam


Limitations: no limitations


General appearance: alert, in no apparent distress


Head exam: Present: atraumatic, normocephalic


Eye exam: Present: normal appearance, PERRL


ENT exam: Present: normal exam


Neck exam: Present: normal inspection.  Absent: tenderness, meningismus


Respiratory exam: Present: normal lung sounds bilaterally.  Absent: respiratory 

distress, wheezes


Cardiovascular Exam: Present: regular rate, normal rhythm


GI/Abdominal exam: Present: soft.  Absent: distended, tenderness, guarding


 exam: Present: urethral discharge (Small amount of blood at the urethral 

meatus surrounding the catheter)


Extremities exam: Present: normal inspection, normal capillary refill.  Absent: 

pedal edema


Back exam: Absent: CVA tenderness (R), CVA tenderness (L)


Neurological exam: Present: alert, oriented X3, CN II-XII intact.  Absent: motor

 sensory deficit


Psychiatric exam: Present: normal affect, normal mood


Skin exam: Present: warm, dry, intact, pallor





Course


                                   Vital Signs











  01/08/21





  13:39


 


Temperature 98.2 F


 


Pulse Rate 97


 


Respiratory 18





Rate 


 


Blood Pressure 144/78


 


O2 Sat by Pulse 99





Oximetry 














Medical Decision Making





- Medical Decision Making





80-year-old male with hematuria.  Patient has maria antonia hematuria, no clots, urine 

is flowing freely through full catheter.  Ultrasound performed shows renal cyst,

 no other acute findings.  Patient has a stable hemoglobin, normal kidney 

function, urinalysis is hemorrhagic.  I discussed case with Dr. Beena oliver 

for urology, recommends close outpatient follow-up at this time.  Patient will 

return with obstruction or retention.





- Lab Data


Result diagrams: 


                                 01/08/21 14:37





                                 01/08/21 14:37


                                   Lab Results











  01/08/21 01/08/21 01/08/21 Range/Units





  14:37 14:37 14:37 


 


WBC  8.8    (3.8-10.6)  k/uL


 


RBC  4.66    (4.30-5.90)  m/uL


 


Hgb  13.7    (13.0-17.5)  gm/dL


 


Hct  40.2    (39.0-53.0)  %


 


MCV  86.3    (80.0-100.0)  fL


 


MCH  29.4    (25.0-35.0)  pg


 


MCHC  34.0    (31.0-37.0)  g/dL


 


RDW  13.5    (11.5-15.5)  %


 


Plt Count  145 L    (150-450)  k/uL


 


MPV  7.7    


 


Neutrophils %  68    %


 


Lymphocytes %  21    %


 


Monocytes %  7    %


 


Eosinophils %  1    %


 


Basophils %  1    %


 


Neutrophils #  6.0    (1.3-7.7)  k/uL


 


Lymphocytes #  1.9    (1.0-4.8)  k/uL


 


Monocytes #  0.7    (0-1.0)  k/uL


 


Eosinophils #  0.1    (0-0.7)  k/uL


 


Basophils #  0.1    (0-0.2)  k/uL


 


PT   10.8   (9.0-12.0)  sec


 


INR   1.0   (<1.2)  


 


APTT   23.5   (22.0-30.0)  sec


 


Sodium     (137-145)  mmol/L


 


Potassium     (3.5-5.1)  mmol/L


 


Chloride     ()  mmol/L


 


Carbon Dioxide     (22-30)  mmol/L


 


Anion Gap     mmol/L


 


BUN     (9-20)  mg/dL


 


Creatinine     (0.66-1.25)  mg/dL


 


Est GFR (CKD-EPI)AfAm     (>60 ml/min/1.73 sqM)  


 


Est GFR (CKD-EPI)NonAf     (>60 ml/min/1.73 sqM)  


 


Glucose     (74-99)  mg/dL


 


Calcium     (8.4-10.2)  mg/dL


 


Total Bilirubin     (0.2-1.3)  mg/dL


 


AST     (17-59)  U/L


 


ALT     (4-49)  U/L


 


Alkaline Phosphatase     ()  U/L


 


Total Protein     (6.3-8.2)  g/dL


 


Albumin     (3.5-5.0)  g/dL


 


Urine Color    Red  


 


Urine Appearance    Bloody  (Clear)  


 


Urine RBC    >182 H  (0-5)  /hpf


 


Urine WBC    >182 H  (0-5)  /hpf


 


Urine Yeast (Budding)    Moderate H  (None)  /hpf














  01/08/21 Range/Units





  14:37 


 


WBC   (3.8-10.6)  k/uL


 


RBC   (4.30-5.90)  m/uL


 


Hgb   (13.0-17.5)  gm/dL


 


Hct   (39.0-53.0)  %


 


MCV   (80.0-100.0)  fL


 


MCH   (25.0-35.0)  pg


 


MCHC   (31.0-37.0)  g/dL


 


RDW   (11.5-15.5)  %


 


Plt Count   (150-450)  k/uL


 


MPV   


 


Neutrophils %   %


 


Lymphocytes %   %


 


Monocytes %   %


 


Eosinophils %   %


 


Basophils %   %


 


Neutrophils #   (1.3-7.7)  k/uL


 


Lymphocytes #   (1.0-4.8)  k/uL


 


Monocytes #   (0-1.0)  k/uL


 


Eosinophils #   (0-0.7)  k/uL


 


Basophils #   (0-0.2)  k/uL


 


PT   (9.0-12.0)  sec


 


INR   (<1.2)  


 


APTT   (22.0-30.0)  sec


 


Sodium  134 L  (137-145)  mmol/L


 


Potassium  4.3  (3.5-5.1)  mmol/L


 


Chloride  100  ()  mmol/L


 


Carbon Dioxide  28  (22-30)  mmol/L


 


Anion Gap  6  mmol/L


 


BUN  18  (9-20)  mg/dL


 


Creatinine  0.76  (0.66-1.25)  mg/dL


 


Est GFR (CKD-EPI)AfAm  >90  (>60 ml/min/1.73 sqM)  


 


Est GFR (CKD-EPI)NonAf  88  (>60 ml/min/1.73 sqM)  


 


Glucose  118 H  (74-99)  mg/dL


 


Calcium  9.4  (8.4-10.2)  mg/dL


 


Total Bilirubin  0.8  (0.2-1.3)  mg/dL


 


AST  32  (17-59)  U/L


 


ALT  20  (4-49)  U/L


 


Alkaline Phosphatase  80  ()  U/L


 


Total Protein  6.7  (6.3-8.2)  g/dL


 


Albumin  4.1  (3.5-5.0)  g/dL


 


Urine Color   


 


Urine Appearance   (Clear)  


 


Urine RBC   (0-5)  /hpf


 


Urine WBC   (0-5)  /hpf


 


Urine Yeast (Budding)   (None)  /hpf














Disposition


Clinical Impression: 


 Hematuria, Renal cyst, UTI (urinary tract infection)





Disposition: HOME SELF-CARE


Condition: Fair


Instructions (If sedation given, give patient instructions):  Urinary Tract 

Infection in Men (ED), Hematuria (ED)


Prescriptions: 


Cephalexin [Keflex] 500 mg PO Q12HR #20 cap


Is patient prescribed a controlled substance at d/c from ED?: No


Referrals: 


Raymond Larson MD [Primary Care Provider] - 1-2 days


Naveen Hargrove MD [STAFF PHYSICIAN] - 1-2 days


Time of Disposition: 15:48

## 2021-01-08 NOTE — US
EXAMINATION TYPE: US renals and bladder

 

DATE OF EXAM: 1/8/2021

 

COMPARISON: NONE

 

CLINICAL HISTORY: Hematuria. Hematuria x 4 days; indwelling bladder catheter

 

EXAM MEASUREMENTS:

 

Right Kidney:  10.9 x 6.3 x 4.5 cm

Left Kidney: 11.2 x 5.2 x 4.3 cm

Post Void Residual Volume:  not assessed on EC patient with indwelling bladder catheter with blood no
kianna within external catheter and bag. 

 

 

 

Right Kidney: multiple small renal cysts with largest mid cortex = 1.3 x 1.1 x 0.6cm   

Left Kidney: No hydronephrosis or masses seen  

Bladder: thickened wall = 1.2cm, indwelling bladder catheter is seen urinary bladder is decompressed 
with a Ashton catheter limiting its evaluation.

 

There is no evidence for hydronephrosis at this point in time.  No nephrolithiasis is seen.

 

 

 

IMPRESSION:

 

1. Decompressed urinary bladder. Ashton catheter is present

2. Right renal cysts

## 2023-05-07 NOTE — P.OP
Date of Procedure: 08/02/18


Preoperative Diagnosis: 


Urethral stricture, BPH


Postoperative Diagnosis: 


Same


Procedure(s) Performed: 


Cystoscopy, direct visual internal ureterotomy (DVIU), bipolar transurethral 

resection of prostate (TURP)


Anesthesia: DARIEN


Surgeon: Mirza Reynaga


Estimated Blood Loss (ml): 300


IV fluids (ml): 1,000


Pathology: other (prostate chips)


Condition: stable


Disposition: PACU


Indications for Procedure: 


The patient is a 77-year-old white male wwith known BPH.  He has been treated 

with combination medical therapy (tamsulosin and finasteride), but continues to 

experience difficulty emptying his bladder.  Cystoscopy reveals a proximal 

bulbous urethral stricture, as well as a completely occluding prostate.  He 

thus comes for cystoscopy, internal ureterotomy, and transurethral resection of 

prostate (TURP).


Operative Findings: 


Proximal bulbous urethral stricture.  Completely obstructing trilobar BPH.


Description of Procedure: 


The patient was taken to the operating room and placed in the dorsolithotomy 

position, with his legs supported in Corona stirrups.  The external genitalia 

was prepped and draped sterilely.  The 0 lens was used to advance the visual 

urethrotome into the urethra.  A stricture was encountered within the bulbous 

urethra, measuring approximately 14 Nepalese in caliber.  The length of the 

stricture was approximately 1 cm.  Using the half-moon blade, the stricture was 

incised at the 12 o'clock position.  The incision was carried through the full-

thickness of the stricture.  It was then possible to advance the visual 

urethrotome into the bladder.





The visual urethrotome was removed, and the Stortz resectoscope sheath was 

introduced into the bladder.  The ureteral orifices could not be identified, as 

there were obscured by a large median lobe.  The resectoscope was withdrawn 

into the bulbous urethra.  Lateral lobe enlargement was also noted, resulting 

in complete obstruction of the prostatic urethra.  Using the bipolar cutting 

loop, the median lobe was resected in its entirety.  Next, each of the lateral 

lobes were resected down to the surgical capsule.  There was a considerable 

amount of anterior tissue, which was then resected.  The floor of the prostate 

was then resected, proximal to the verumontanum.  This left the prostatic fossa 

wide open.  The apical tissue was then carefully resected.  Blood loss 

throughout the procedure was somewhat greater than is typical, at times making 

visualization difficult.  The entire prostatic fossa was carefully examined.  

Any areas of bleeding were controlled with electrocautery, but it was not 

possible to obtain excellent hemostasis.  Care was taken not to resect the 

apical tissue beyond the verumontanum, thus leaving the external urinary 

sphincter intact.  The [employer] evacuator was used to remove all prostatic chips 

from the bladder.  These were saved and sent for pathologic examination.  The 

resectoscope was removed, and a 22-Nepalese, three-way Ashton catheter was placed.

  Continuous bladder irrigation was started using 0.9 normal saline.  The 

return was pink tinged.  The patient tolerated the procedure well was taken to 

the recovery room in stable condition.
9